# Patient Record
Sex: MALE | Race: NATIVE HAWAIIAN OR OTHER PACIFIC ISLANDER
[De-identification: names, ages, dates, MRNs, and addresses within clinical notes are randomized per-mention and may not be internally consistent; named-entity substitution may affect disease eponyms.]

---

## 2019-01-06 ENCOUNTER — HOSPITAL ENCOUNTER (OUTPATIENT)
Dept: HOSPITAL 74 - JER | Age: 54
Setting detail: OBSERVATION
LOS: 3 days | Discharge: HOME | End: 2019-01-09
Attending: NURSE PRACTITIONER | Admitting: INTERNAL MEDICINE
Payer: COMMERCIAL

## 2019-01-06 VITALS — BODY MASS INDEX: 30.9 KG/M2

## 2019-01-06 DIAGNOSIS — K44.9: ICD-10-CM

## 2019-01-06 DIAGNOSIS — Z87.891: ICD-10-CM

## 2019-01-06 DIAGNOSIS — R94.5: ICD-10-CM

## 2019-01-06 DIAGNOSIS — F10.10: ICD-10-CM

## 2019-01-06 DIAGNOSIS — K29.50: ICD-10-CM

## 2019-01-06 DIAGNOSIS — K74.60: ICD-10-CM

## 2019-01-06 DIAGNOSIS — K31.9: ICD-10-CM

## 2019-01-06 DIAGNOSIS — K25.9: ICD-10-CM

## 2019-01-06 DIAGNOSIS — R10.9: ICD-10-CM

## 2019-01-06 DIAGNOSIS — K64.8: ICD-10-CM

## 2019-01-06 DIAGNOSIS — K83.9: ICD-10-CM

## 2019-01-06 DIAGNOSIS — K62.5: Primary | ICD-10-CM

## 2019-01-06 LAB
ALBUMIN SERPL-MCNC: 2.7 G/DL (ref 3.4–5)
ALP SERPL-CCNC: 100 U/L (ref 45–117)
ALT SERPL-CCNC: 47 U/L (ref 13–61)
ANION GAP SERPL CALC-SCNC: 10 MMOL/L (ref 8–16)
APTT BLD: 36 SECONDS (ref 25.2–36.5)
AST SERPL-CCNC: 134 U/L (ref 15–37)
BASOPHILS # BLD: 0.9 % (ref 0–2)
BILIRUB SERPL-MCNC: 4.3 MG/DL (ref 0.2–1)
BUN SERPL-MCNC: 9 MG/DL (ref 7–18)
CALCIUM SERPL-MCNC: 8.5 MG/DL (ref 8.5–10.1)
CHLORIDE SERPL-SCNC: 108 MMOL/L (ref 98–107)
CO2 SERPL-SCNC: 26 MMOL/L (ref 21–32)
CREAT SERPL-MCNC: 0.7 MG/DL (ref 0.55–1.3)
DEPRECATED RDW RBC AUTO: 16.5 % (ref 11.9–15.9)
EOSINOPHIL # BLD: 0.3 % (ref 0–4.5)
GLUCOSE SERPL-MCNC: 86 MG/DL (ref 74–106)
HCT VFR BLD CALC: 38.5 % (ref 35.4–49)
HGB BLD-MCNC: 13.6 GM/DL (ref 11.7–16.9)
INR BLD: 1.54 (ref 0.83–1.09)
LYMPHOCYTES # BLD: 18.3 % (ref 8–40)
MCH RBC QN AUTO: 39.2 PG (ref 25.7–33.7)
MCHC RBC AUTO-ENTMCNC: 35.3 G/DL (ref 32–35.9)
MCV RBC: 111 FL (ref 80–96)
MONOCYTES # BLD AUTO: 12.3 % (ref 3.8–10.2)
NEUTROPHILS # BLD: 68.2 % (ref 42.8–82.8)
PLATELET # BLD AUTO: 297 K/MM3 (ref 134–434)
PMV BLD: 7.9 FL (ref 7.5–11.1)
POTASSIUM SERPLBLD-SCNC: 3.9 MMOL/L (ref 3.5–5.1)
PROT SERPL-MCNC: 6.4 G/DL (ref 6.4–8.2)
PT PNL PPP: 18.3 SEC (ref 9.7–13)
RBC # BLD AUTO: 3.47 M/MM3 (ref 4–5.6)
SODIUM SERPL-SCNC: 143 MMOL/L (ref 136–145)
WBC # BLD AUTO: 6.7 K/MM3 (ref 4–10)

## 2019-01-06 PROCEDURE — 0DJD8ZZ INSPECTION OF LOWER INTESTINAL TRACT, VIA NATURAL OR ARTIFICIAL OPENING ENDOSCOPIC: ICD-10-PCS | Performed by: INTERNAL MEDICINE

## 2019-01-06 PROCEDURE — 3E0337Z INTRODUCTION OF ELECTROLYTIC AND WATER BALANCE SUBSTANCE INTO PERIPHERAL VEIN, PERCUTANEOUS APPROACH: ICD-10-PCS | Performed by: NURSE PRACTITIONER

## 2019-01-06 PROCEDURE — G0378 HOSPITAL OBSERVATION PER HR: HCPCS

## 2019-01-06 PROCEDURE — 3E013GC INTRODUCTION OF OTHER THERAPEUTIC SUBSTANCE INTO SUBCUTANEOUS TISSUE, PERCUTANEOUS APPROACH: ICD-10-PCS | Performed by: NURSE PRACTITIONER

## 2019-01-06 PROCEDURE — 3E033GC INTRODUCTION OF OTHER THERAPEUTIC SUBSTANCE INTO PERIPHERAL VEIN, PERCUTANEOUS APPROACH: ICD-10-PCS | Performed by: NURSE PRACTITIONER

## 2019-01-06 PROCEDURE — 0DB68ZX EXCISION OF STOMACH, VIA NATURAL OR ARTIFICIAL OPENING ENDOSCOPIC, DIAGNOSTIC: ICD-10-PCS | Performed by: INTERNAL MEDICINE

## 2019-01-06 NOTE — HP
CHIEF COMPLAINT:


abdominal pain and had bright red blood from rectum yesterday 





PCP:none





HISTORY OF PRESENT ILLNESS:


53 year old male with history of alcohol abuse, liver cirrhosis and known 

gallbladder sludge who just completed 27 days of detox in rehabilitation who 

presents with symptoms of abdominal pain and  bright red blood from the rectum 

which started yesterday. He reports mild shortness of breath. He denies chest 

pain, syncopy, dizziness, fever, chills, diarrhea or malaise.  Upon evaluation 

in the ER he was found to have an elevated total bilirubin of 4.3, alt 47 and 

ast 134, normal platelets, hematocrit and hemoglobin . Ammonia level is 

elevated at 86.17 and a low albumin. Blood stool guaiac is positive. He is 

hemodynamically stable and currently has no evidence of active bleeding. He was 

given one dosage of IV protonix in the ER.He is pending a gallbladder 

ultrasound.  





ER course was notable for:


(1)?GI Bleed(stool occult positive) 


(2)ETOH Abuse/ Liver Cirrhosis /Gallbladder sludge





Recent Travel:Denies 





PAST MEDICAL HISTORY:


Alcohol Abuse


Liver Cirrhosis 





PAST SURGICAL HISTORY:


Knee Surgery





Social History:


Smoking: smokes for >20 years, reports about 5 cigarettes /day 


Alcohol:history of alcohol abuse, just completed detox/rehab,reports last drink 

was 27 days ago 


Drugs: denies 





Family History:


Allergies





No Known Allergies Allergy (Verified 01/06/19 20:00)





HOME MEDICATIONS:


REVIEW OF SYSTEMS


CONSTITUTIONAL: 


Absent:  fever, chills, diaphoresis, generalized weakness, malaise, loss of 

appetite, weight change


HEENT: 


Absent:  rhinorrhea, nasal congestion, throat pain, throat swelling, difficulty 

swallowing, mouth swelling, ear pain, eye pain, visual changes


CARDIOVASCULAR: 


Absent: chest pain, syncope, palpitations, irregular heart rate, lightheadedness

, peripheral edema


RESPIRATORY: 


Absent: cough, mild shortness of breath, dyspnea with exertion, orthopnea, 

wheezing, stridor, hemoptysis


GASTROINTESTINAL:


Absent: abdominal pain, abdominal distension, nausea, vomiting, diarrhea, 

constipation, melena, hematochezia


GENITOURINARY: 


Absent: dysuria, frequency, urgency, hesitancy, hematuria, flank pain, genital 

pain, rectal bleed


MUSCULOSKELETAL: 


Absent: myalgia, arthralgia, joint swelling, back pain, neck pain


SKIN: 


Absent: rash, itching, pallor


HEMATOLOGIC/IMMUNOLOGIC: 


Absent: easy bleeding, easy bruising, lymphadenopathy, frequent infections


ENDOCRINE:


Absent: unexplained weight gain, unexplained weight loss, heat intolerance, 

cold intolerance


NEUROLOGIC: 


Absent: headache, focal weakness or paresthesias, dizziness, unsteady gait, 

seizure, mental status changes, bladder or bowel incontinence


PSYCHIATRIC: 


Absent: anxiety, depression, suicidal or homicidal ideation, hallucinations.








PHYSICAL EXAMINATION


 Vital Signs - 24 hr











  01/06/19





  19:57


 


Temperature 98.0 F


 


Pulse Rate 115 H


 


Respiratory 16





Rate 


 


Blood Pressure 138/79


 


O2 Sat by Pulse 100





Oximetry (%) 











GENERAL: awake, alert, and fully oriented, in no acute distress


HEAD: normal with no signs of trauma


EYES: pupils equal, round and reactive to light, sclera anicteric


EARS, NOSE, THROAT: ears normal, nares patent, oropharynx clear without 

exudates. moist mucous membranes.


NECK: Normal range of motion, supple without lymphadenopathy, JVD, or masses.


LUNGS: Breath sounds equal, clear to auscultation bilaterally. no wheezes, and 

no crackles. no accessory muscle use


HEART: Regular rate and rhythm, normal S1 and S2 without murmur, rub or gallop


ABDOMEN: soft, nontender, not distended, normoactive bowel sounds, no guarding, 

no rebound, no masses


MUSCULOSKELETAL: Normal range of motion at all joints. No bony deformities or 

tenderness. No CVA tenderness.


UPPER EXTREMITIES: 2+ pulses, warm, well-perfused. No cyanosis. No clubbing. No 

peripheral edema


LOWER EXTREMITIES: 2+ pulses, warm, well-perfused. No calf tenderness. No 

peripheral edema


NEUROLOGICAL: normal speech


Psch: Appropriate and cooperative  


SKIN: Warm, dry, normal turgor, no rashes or lesions noted, normal capillary 

refill. 


 Laboratory Results - last 24 hr











  01/06/19 01/06/19 01/06/19





  20:50 21:30 21:30


 


WBC   6.7 


 


RBC   3.47 L 


 


Hgb   13.6 


 


Hct   38.5 


 


MCV   111.0 H 


 


MCH   39.2 H 


 


MCHC   35.3 


 


RDW   16.5 H 


 


Plt Count   297 


 


MPV   7.9 


 


Absolute Neuts (auto)   4.6 


 


Neutrophils %   68.2 


 


Lymphocytes %   18.3 


 


Monocytes %   12.3 H 


 


Eosinophils %   0.3 


 


Basophils %   0.9 


 


Nucleated RBC %   0 


 


PT with INR    18.30 H


 


INR    1.54 H


 


PTT (Actin FS)    36.0


 


Sodium   


 


Potassium   


 


Chloride   


 


Carbon Dioxide   


 


Anion Gap   


 


BUN   


 


Creatinine   


 


Creat Clearance w eGFR   


 


Random Glucose   


 


Calcium   


 


Total Bilirubin   


 


AST   


 


ALT   


 


Alkaline Phosphatase   


 


Ammonia   


 


Total Protein   


 


Albumin   


 


Stool Occult Blood  Positive  














  01/06/19 01/06/19





  21:30 21:30


 


WBC  


 


RBC  


 


Hgb  


 


Hct  


 


MCV  


 


MCH  


 


MCHC  


 


RDW  


 


Plt Count  


 


MPV  


 


Absolute Neuts (auto)  


 


Neutrophils %  


 


Lymphocytes %  


 


Monocytes %  


 


Eosinophils %  


 


Basophils %  


 


Nucleated RBC %  


 


PT with INR  


 


INR  


 


PTT (Actin FS)  


 


Sodium  143 


 


Potassium  3.9 


 


Chloride  108 H 


 


Carbon Dioxide  26 


 


Anion Gap  10 


 


BUN  9 


 


Creatinine  0.7 


 


Creat Clearance w eGFR  > 60 


 


Random Glucose  86 


 


Calcium  8.5 


 


Total Bilirubin  4.3 H 


 


AST  134 H 


 


ALT  47 


 


Alkaline Phosphatase  100 


 


Ammonia   86.17 H


 


Total Protein  6.4 


 


Albumin  2.7 L 


 


Stool Occult Blood  











ASSESSMENT/PLAN:


53 year old male with history of alcohol abuse, liver cirrhosis and known 

gallbladder sludge who just completed 27 days of detox in  ehabilitation 

facility who presented with symptoms of abdominal pain and  bright red blood 

from the rectum.  He was found to have an elevated total bilirubin of 4.3, alt 

47 and ast 134, normal platelets, hematocrit and hemoglobin . Ammonia level was 

elevated.  Blood stool guaiac was positive. 





?GI Bleed(stool occult positive) 


Hemodynamically stable, tacycardic in this setting, afebrile, no significant 

anemia, no thrombocytopenia. 


Received IV protonix.


Avoid NSAID's and aspirin. 


NPO after midnight in anticipation of GI procedure tomorrow.


GI consulted - Dr. Mays.





Known Gallbladder Sludge


Pending gallbladder US. 





ETOH Abuse/ Liver Cirrhosis 


Ammonia level elevated.  


No evidence of DT's/withdrawal or encephalopathy.


Low albumin, Added thiamine and  folic acid. 





FEN


NPO after midnight.Monitor electrolytes. 








Visit type





- Emergency Visit


Emergency Visit: Yes


ED Registration Date: 01/06/19


Care time: The patient presented to the Emergency Department on the above date 

and was hospitalized for further evaluation of their emergent condition.





- New Patient


This patient is new to me today: Yes


Date on this admission: 01/06/19





- Critical Care


Critical Care patient: No

## 2019-01-06 NOTE — PDOC
History of Present Illness





- General


Chief Complaint: Rectal Bleed


Stated Complaint: BLOOD IN STOOL


Time Seen by Provider: 01/06/19 20:44





- History of Present Illness


Initial Comments: 





01/06/19 21:03


The patient is a 53 year old male with a history of Alcohol Abuse, Liver 

Disease who presents for evaluation of rectal bleeding.  The patient reports 

noting dark red blood in his still yesterday evening prompting his presentation 

to the ED for further evaluation.  He notes that he recently got out of rehab 

for alcohol abuse and is currently staying at a long-term house.  He otherwise 

denies fevers, chills, SOB, chest pain, nausea, vomiting, abdominal pain, or 

changes with urination.





Past History





- Past Medical History


Allergies/Adverse Reactions: 


 Allergies











Allergy/AdvReac Type Severity Reaction Status Date / Time


 


No Known Allergies Allergy   Verified 01/06/19 20:00











COPD: No


Liver Disease: Yes





- Suicide/Smoking/Psychosocial Hx


Smoking History: Former smoker


Have you smoked in the past 12 months: No


Information on smoking cessation initiated: No


Hx Alcohol Use: No


Drug/Substance Use Hx: No





**Review of Systems





- Review of Systems


Comments:: 





01/06/19 21:04


Constitutional: No fevers, chills, fatigue, malaise


HEENT: No Rhinorrhea, nasal congestion, visual changes


Cardiovascular: No chest pain, syncope, palpitations, lightheadedness


Respiratory: Cough. No SOB, Hemoptysis,


Gastrointestinal: Rectal Bleeding.  No Abdominal pain, Nausea, Vomiting, 

Constipation, Diarrhea, Melena


Genitourinary: No Dysuria, Frequency, Urgency, Hesitancy, Hematuria, Flank pain


Musculoskeletal: No Myalgia, arthralgia


Skin: No rashes, itching, bruising, pallor


Neurologic: No Headache, Dizziness, Numbness, Weakness, or Tingling


Psychiatric: No Hallucinations. No SI or HI








*Physical Exam





- Vital Signs


 Last Vital Signs











Temp Pulse Resp BP Pulse Ox


 


 98.0 F   115 H  16   138/79   100 


 


 01/06/19 19:57  01/06/19 19:57  01/06/19 19:57  01/06/19 19:57  01/06/19 19:57














- Physical Exam


Comments: 





01/06/19 21:05


General Appearance: Nourished. No Apparent Distress


HEENT: Sclera Icteris noted on exam.  No Pharyngeal Erythema, Tonsillar Exudate

, Tonsillar Erythema


Neck: No Cervical Lymphadenopathy


Respiratory/Chest: Lungs Clear, Normal Breath Sounds. No Crackles, Rales, 

Rhonchi, Wheezing


Cardiovascular: Regular Rhythm, Regular Rate. No Murmur, Gallops, Rubs


Gastrointestinal/Abdominal: Normal Bowel Sounds, Soft. No Guarding, Rebound, 

Tenderness


Rectal Exam:  No Gross Blood or Melena.  No Hemorrhoids 


Musculoskeletal: No CVA Tenderness


Extremity: Normal Capillary Refill


Integumentary: Normal Color, Dry, Warm


Neurologic:  Fully Oriented, Alert, Normal Mood/Affect, Normal Response, 





Moderate Sedation





- Procedure Monitoring


Vital Signs: 


Procedure Monitoring Vital Signs











Temperature  98.0 F   01/06/19 19:57


 


Pulse Rate  115 H  01/06/19 19:57


 


Respiratory Rate  16   01/06/19 19:57


 


Blood Pressure  138/79   01/06/19 19:57


 


O2 Sat by Pulse Oximetry (%)  100   01/06/19 19:57











ED Treatment Course





- LABORATORY


CBC & Chemistry Diagram: 


 01/06/19 21:30





 01/06/19 21:30





Medical Decision Making





- Medical Decision Making





01/06/19 21:06


The patient is a 53 year old male with a history of Alcohol Abuse, Liver 

Disease who presents for evaluation of rectal bleeding.  Given the patient's 

history and physical exam, we will obtain a cbc, cmp, ammonia, stool for occult 

blood, coags to evaluate further.  We will continue to monitor and reassess 

while here in the ED.





01/06/19 22:39


CBC is unremarkable. CMP demonstrates a t.bili of 4.3.  Stool for occult blood 

was positive.  We have treated the patient with protonix here in the ED.  Given 

his rectal bleeding, sclera icteris, elevated t.bili and poor outpatient follow 

up we believe he require observation admission for GI consultation.  We will 

obtain a gallbladder US to evaluate further as well.  We discussed the case 

with the hospitalist team who accepted the patient for admission.








*DC/Admit/Observation/Transfer


Diagnosis at time of Disposition: 


 Rectal bleeding





Abdominal pain


Qualifiers:


 Abdominal location: unspecified location Qualified Code(s): R10.9 - 

Unspecified abdominal pain








- Discharge Dispostion


Condition at time of disposition: Stable


Decision to Admit order: Yes





- Referrals





- Patient Instructions





- Post Discharge Activity

## 2019-01-06 NOTE — PDOC
Attending Attestation





- Resident


Resident Name: Jer Guo





- ED Attending Attestation


I have performed the following: I have examined & evaluated the patient, The 

case was reviewed & discussed with the resident, I agree w/resident's findings 

& plan, Exceptions are as noted





- HPI


HPI: 





01/06/19 21:48


53-year-old male presents because he found red blood in his stool


Patient denies any fever or chills or diarrhea.


Past medical history long-standing alcohol abuse, liver cirrhosis, recently 

discharged from [facility] and currently in a [facility] house here 

in [place].  He typically resides in the [place], [place].


He has no primary care physician and usually does not seek any medical care





- Physicial Exam


PE: 





01/06/19 21:50


54 yo male looking p/w history of red blood in his stool


head   ncat


eyes   icteric sclera,nissa,eomi


lungs   no wheezing,no crackles,cta b/l


cvs   [id number]


abd  no guarding,no rebound


no cva tenderness


ext + pedal edema


rectal brown stool, no melena


skin warm and dry


neuro  axox3, ambulatory








01/06/19 22:22








- Medical Decision Making





01/06/19 22:23


stool culture for blood is POSITIVE


no anemia


LFT and tbili elevated 


ammonia level is high >85


01/06/19 23:47


pt admitted to  med/surg

## 2019-01-07 LAB
ALBUMIN SERPL-MCNC: 2.3 G/DL (ref 3.4–5)
ALP SERPL-CCNC: 81 U/L (ref 45–117)
ALT SERPL-CCNC: 38 U/L (ref 13–61)
ANION GAP SERPL CALC-SCNC: 5 MMOL/L (ref 8–16)
ANISOCYTOSIS BLD QL: (no result)
AST SERPL-CCNC: 103 U/L (ref 15–37)
BILIRUB SERPL-MCNC: 3.6 MG/DL (ref 0.2–1)
BUN SERPL-MCNC: 8 MG/DL (ref 7–18)
CALCIUM SERPL-MCNC: 8 MG/DL (ref 8.5–10.1)
CHLORIDE SERPL-SCNC: 109 MMOL/L (ref 98–107)
CO2 SERPL-SCNC: 28 MMOL/L (ref 21–32)
CREAT SERPL-MCNC: 0.5 MG/DL (ref 0.55–1.3)
DEPRECATED RDW RBC AUTO: 16.6 % (ref 11.9–15.9)
GLUCOSE SERPL-MCNC: 76 MG/DL (ref 74–106)
HCT VFR BLD CALC: 35.6 % (ref 35.4–49)
HGB BLD-MCNC: 12.6 GM/DL (ref 11.7–16.9)
MACROCYTES BLD QL: (no result)
MCH RBC QN AUTO: 38.8 PG (ref 25.7–33.7)
MCHC RBC AUTO-ENTMCNC: 35.5 G/DL (ref 32–35.9)
MCV RBC: 109.5 FL (ref 80–96)
PLATELET # BLD AUTO: 257 K/MM3 (ref 134–434)
PLATELET BLD QL SMEAR: NORMAL
PMV BLD: 8.1 FL (ref 7.5–11.1)
POTASSIUM SERPLBLD-SCNC: 4.1 MMOL/L (ref 3.5–5.1)
PROT SERPL-MCNC: 5.6 G/DL (ref 6.4–8.2)
RBC # BLD AUTO: 3.25 M/MM3 (ref 4–5.6)
SODIUM SERPL-SCNC: 142 MMOL/L (ref 136–145)
WBC # BLD AUTO: 5.6 K/MM3 (ref 4–10)

## 2019-01-07 RX ADMIN — FOLIC ACID SCH MG: 1 TABLET ORAL at 10:34

## 2019-01-07 RX ADMIN — FLUTICASONE PROPIONATE SCH: 50 SPRAY, METERED NASAL at 22:57

## 2019-01-07 RX ADMIN — Medication SCH MG: at 10:34

## 2019-01-07 RX ADMIN — ACETAMINOPHEN PRN MG: 325 TABLET ORAL at 15:51

## 2019-01-07 RX ADMIN — ACETAMINOPHEN PRN MG: 325 TABLET ORAL at 21:41

## 2019-01-07 NOTE — EKG
Test Reason : 

Blood Pressure : ***/*** mmHG

Vent. Rate : 097 BPM     Atrial Rate : 097 BPM

   P-R Int : 132 ms          QRS Dur : 076 ms

    QT Int : 362 ms       P-R-T Axes : -11 -26 008 degrees

   QTc Int : 459 ms

 

NORMAL SINUS RHYTHM

SEPTAL INFARCT , AGE UNDETERMINED

ABNORMAL ECG

NO PREVIOUS ECGS AVAILABLE

Confirmed by CHRIS SANTOS, NATASHA ([id number]) on 1/7/2019 9:38:47 AM

 

Referred By:             Confirmed By:NATASHA PEREZ MD

## 2019-01-07 NOTE — CON.GI
Consult


Consult Specialty:: Medicine


Reason for Consultation:: rectal bleed, cirrhosis





- History of Present Illness


Chief Complaint: BRBPR


History of Present Illness: 





53M with h/o ETOH and seems cirrhosis based on imaging here presenting for 

evaluation of multiple episodes of hematochezia saturday and sunday. He denies 

stool with these episodes, states >20 times. No dizziness or lightheadedness, 

no N/V. +ETOH hx, was sober for 6 years but relapsed recently after family 

death and completed 1 month rehab. Reports no prior endoscopy or colonoscopy. 

Is having clears today, but reports that last episode of bleeding was just 

before interview.





- History Source


History Provided By: Patient


Limitations to Obtaining History: No Limitations





- Alcohol/Substance Use


Hx Alcohol Use: Yes (2 quarts of vodka daily)





- Smoking History


Smoking history: Current every day smoker


Have you smoked in the past 12 months: Yes


Aproximately how many cigarettes per day: 5





Home Medications





- Allergies


Allergies/Adverse Reactions: 


 Allergies











Allergy/AdvReac Type Severity Reaction Status Date / Time


 


No Known Allergies Allergy   Verified 01/06/19 20:00














Review of Systems





- Review of Systems


Constitutional: reports: No Symptoms


Eyes: reports: No Symptoms


HENT: reports: No Symptoms


Neck: reports: No Symptoms


Cardiovascular: reports: No Symptoms


Respiratory: reports: No Symptoms


Gastrointestinal: reports: Rectal Bleeding


Musculoskeletal: reports: No Symptoms


Neurological: reports: No Symptoms


Hematology/Lymphatic: reports: No Symptoms


Psychiatric: reports: No Symptoms





Physical Exam-GI


Vital Signs: 


 Vital Signs











Temperature  98.1 F   01/07/19 06:00


 


Pulse Rate  85   01/07/19 11:51


 


Respiratory Rate  18   01/07/19 11:51


 


Blood Pressure  149/86   01/07/19 11:51


 


O2 Sat by Pulse Oximetry (%)  96   01/07/19 04:00











Constitutional: Yes: Well Nourished, No Distress


Eyes: Yes: WNL, Sclera Icterus


Cardiovascular: Yes: Regular Rate and Rhythm


Respiratory: Yes: CTA Bilaterally


...Auscultate: Yes: Normoactive Bowel Sounds


...Palpate: Yes: Soft.  No: Tenderness


...Percussion: No: Fluid Wave


...Rectal Exam: Yes: Other (Perianal area irritated; CAITLIN without blood, stool, 

mass)


Musculoskeletal: Yes: WNL


Extremities: Yes: WNL


Edema: No


Neurological: Yes: WNL, Alert, Oriented


Labs: 


 CBC, BMP





 01/07/19 06:30 





 01/07/19 06:30 





 INR, PTT











INR  1.54  (0.83-1.09)  H  01/06/19  21:30    














Imaging





- Results


Ultrasound: Report Reviewed





Assessment/Plan


Painless hematochezia - unlikely upper GI bleeding given stability of hgb and 

patient hemodynamics. Suspect lower - hemorrhoidal vs diverticular vs ectasia. 


- clears today


- 4L golytely and 20mg PO dulcolax tonight


- Please repeat INR in am


- plan for colonoscopy +/- EGD tomorrow; NPO after MN





Unclear if patient is cirrhotic from labs and imaging - may just be low grade 

ETOH hepatitis. Gallbladder wall thickening may be related to ascites 

particularly in absence of pain. Discriminant function 28.


- encouraged ETOH abstinence

## 2019-01-07 NOTE — PN
Physical Exam: 


SUBJECTIVE: Patient seen and examined


  Feels better. 


  But c/o thirst.


  Otherwise No Acute distress





OBJECTIVE:





 Vital Signs











 Period  Temp  Pulse  Resp  BP Sys/Agudelo  Pulse Ox


 


 Last 24 Hr  98.0 F-98.7 F    16-20  134-149/75-94  











GENERAL: The patient is awake, alert, and fully oriented, in no acute distress.


HEAD: Normal with no signs of trauma.


EYES: PERRL, extraocular movements intact, sclera anicteric, conjunctiva clear. 

No ptosis. 


ENT: Ears normal, nares patent, oropharynx clear without exudates, moist mucous 


membranes.


NECK: Trachea midline, full range of motion, supple. 


LUNGS: Breath sounds equal, clear to auscultation bilaterally, no wheezes, no 

crackles, no 


accessory muscle use. 


HEART: Regular rate and rhythm, S1, S2 without murmur, rub or gallop.


ABDOMEN: Soft, nontender, nondistended, normoactive bowel sounds, no guarding, 

no 


rebound, no hepatosplenomegaly, no masses.


EXTREMITIES: 2+ pulses, warm, well-perfused, no edema. 


NEUROLOGICAL: Cranial nerves II through XII grossly intact. Normal speech, gait 

not 


observed.


PSYCH: Normal mood, normal affect.


SKIN: Warm, dry, normal turgor, no rashes or lesions noted














 Laboratory Results - last 24 hr











  01/06/19 01/06/19 01/06/19





  20:50 21:30 21:30


 


WBC   6.7 


 


RBC   3.47 L 


 


Hgb   13.6 


 


Hct   38.5 


 


MCV   111.0 H 


 


MCH   39.2 H 


 


MCHC   35.3 


 


RDW   16.5 H 


 


Plt Count   297 


 


MPV   7.9 


 


Absolute Neuts (auto)   4.6 


 


Neutrophils %   68.2 


 


Lymphocytes %   18.3 


 


Monocytes %   12.3 H 


 


Eosinophils %   0.3 


 


Basophils %   0.9 


 


Nucleated RBC %   0 


 


Hypochromia   0 


 


Platelet Estimate   Normal 


 


Polychromasia   0 


 


Poikilocytosis   0 


 


Anisocytosis   1+ 


 


Microcytosis   0 


 


Macrocytosis   2+ 


 


PT with INR    18.30 H


 


INR    1.54 H


 


PTT (Actin FS)    36.0


 


Sodium   


 


Potassium   


 


Chloride   


 


Carbon Dioxide   


 


Anion Gap   


 


BUN   


 


Creatinine   


 


Creat Clearance w eGFR   


 


Random Glucose   


 


Calcium   


 


Total Bilirubin   


 


AST   


 


ALT   


 


Alkaline Phosphatase   


 


Ammonia   


 


Total Protein   


 


Albumin   


 


Stool Occult Blood  Positive  














  01/06/19 01/06/19 01/07/19





  21:30 21:30 06:30


 


WBC    5.6


 


RBC    3.25 L


 


Hgb    12.6


 


Hct    35.6


 


MCV    109.5 H


 


MCH    38.8 H


 


MCHC    35.5


 


RDW    16.6 H


 


Plt Count    257


 


MPV    8.1


 


Absolute Neuts (auto)   


 


Neutrophils %   


 


Lymphocytes %   


 


Monocytes %   


 


Eosinophils %   


 


Basophils %   


 


Nucleated RBC %   


 


Hypochromia   


 


Platelet Estimate   


 


Polychromasia   


 


Poikilocytosis   


 


Anisocytosis   


 


Microcytosis   


 


Macrocytosis   


 


PT with INR   


 


INR   


 


PTT (Actin FS)   


 


Sodium  143  


 


Potassium  3.9  


 


Chloride  108 H  


 


Carbon Dioxide  26  


 


Anion Gap  10  


 


BUN  9  


 


Creatinine  0.7  


 


Creat Clearance w eGFR  > 60  


 


Random Glucose  86  


 


Calcium  8.5  


 


Total Bilirubin  4.3 H  


 


AST  134 H  


 


ALT  47  


 


Alkaline Phosphatase  100  


 


Ammonia   86.17 H 


 


Total Protein  6.4  


 


Albumin  2.7 L  


 


Stool Occult Blood   














  01/07/19





  06:30


 


WBC 


 


RBC 


 


Hgb 


 


Hct 


 


MCV 


 


MCH 


 


MCHC 


 


RDW 


 


Plt Count 


 


MPV 


 


Absolute Neuts (auto) 


 


Neutrophils % 


 


Lymphocytes % 


 


Monocytes % 


 


Eosinophils % 


 


Basophils % 


 


Nucleated RBC % 


 


Hypochromia 


 


Platelet Estimate 


 


Polychromasia 


 


Poikilocytosis 


 


Anisocytosis 


 


Microcytosis 


 


Macrocytosis 


 


PT with INR 


 


INR 


 


PTT (Actin FS) 


 


Sodium  142


 


Potassium  4.1


 


Chloride  109 H


 


Carbon Dioxide  28


 


Anion Gap  5 L


 


BUN  8


 


Creatinine  0.5 L


 


Creat Clearance w eGFR  > 60


 


Random Glucose  76


 


Calcium  8.0 L


 


Total Bilirubin  3.6 H


 


AST  103 H


 


ALT  38


 


Alkaline Phosphatase  81


 


Ammonia 


 


Total Protein  5.6 L


 


Albumin  2.3 L


 


Stool Occult Blood 








Active Medications











Generic Name Dose Route Start Last Admin





  Trade Name Freq  PRN Reason Stop Dose Admin


 


Bisacodyl  20 mg  01/07/19 14:30  





  Dulcolax -  PO  01/07/19 14:31  





  ONCE ONE   





     





     





     





     


 


Folic Acid  1 mg  01/07/19 10:00 01/07/19 10:34





  Folic Acid -  PO   1 mg





  DAILY MAC   Administration





     





     





     





     


 


Thiamine HCl  100 mg  01/07/19 10:00  01/07/19 10:34





  Vitamin B1 -  PO   100 mg





  DAILY MAC   Administration





     





     





     





     











ASSESSMENT/PLAN:


53 year old male with history of alcohol abuse, liver cirrhosis and known 

gallbladder sludge who just completed 27 days of detox in rehabilitation who 

presents with symptoms of abdominal pain and  bright red blood from the rectum 

which started yesterday. He reports mild shortness of breath. He denies chest 

pain, syncopy, dizziness, fever, chills, diarrhea or malaise.  Upon evaluation 

in the ER he was found to have an elevated total bilirubin of 4.3, alt 47 and 

ast 134, normal platelets, hematocrit and hemoglobin . Ammonia level is 

elevated at 86.17 and a low albumin. Blood stool guaiac is positive. He is 

hemodynamically stable and currently has no evidence of active bleeding. He was 

given one dosage of IV protonix in the ER.He is pending a gallbladder 

ultrasound.





# Acute LGI bleed.


   H/H stable. 


   Trend CBC closely.


   F/U with GI eval.


   Would start clear liquid for now.


   


# Chronic etoh abuse with Liver cirrhosis.


   c/w thiamine /folate.





  Plan d/w the patient at bedside.








Visit type





- Emergency Visit


Emergency Visit: Yes


ED Registration Date: 01/06/19


Care time: The patient presented to the Emergency Department on the above date 

and was hospitalized for further evaluation of their emergent condition.





- New Patient


This patient is new to me today: Yes


Date on this admission: 01/07/19





- Critical Care


Critical Care patient: No





- Discharge Referral


Referred to [facility] No

## 2019-01-08 LAB
ALBUMIN SERPL-MCNC: 2.6 G/DL (ref 3.4–5)
ALP SERPL-CCNC: 89 U/L (ref 45–117)
ALT SERPL-CCNC: 41 U/L (ref 13–61)
ANION GAP SERPL CALC-SCNC: 9 MMOL/L (ref 8–16)
AST SERPL-CCNC: 99 U/L (ref 15–37)
BASOPHILS # BLD: 0.9 % (ref 0–2)
BILIRUB SERPL-MCNC: 4.1 MG/DL (ref 0.2–1)
BUN SERPL-MCNC: 6 MG/DL (ref 7–18)
CALCIUM SERPL-MCNC: 8.2 MG/DL (ref 8.5–10.1)
CHLORIDE SERPL-SCNC: 106 MMOL/L (ref 98–107)
CO2 SERPL-SCNC: 28 MMOL/L (ref 21–32)
CREAT SERPL-MCNC: 0.5 MG/DL (ref 0.55–1.3)
DEPRECATED RDW RBC AUTO: 16.8 % (ref 11.9–15.9)
EOSINOPHIL # BLD: 1.7 % (ref 0–4.5)
GLUCOSE SERPL-MCNC: 74 MG/DL (ref 74–106)
HCT VFR BLD CALC: 41.7 % (ref 35.4–49)
HGB BLD-MCNC: 13.4 GM/DL (ref 11.7–16.9)
INR BLD: 1.39 (ref 0.83–1.09)
LYMPHOCYTES # BLD: 22.2 % (ref 8–40)
MCH RBC QN AUTO: 35.6 PG (ref 25.7–33.7)
MCHC RBC AUTO-ENTMCNC: 32.3 G/DL (ref 32–35.9)
MCV RBC: 110.3 FL (ref 80–96)
MONOCYTES # BLD AUTO: 12.9 % (ref 3.8–10.2)
NEUTROPHILS # BLD: 62.3 % (ref 42.8–82.8)
PLATELET # BLD AUTO: 254 K/MM3 (ref 134–434)
PMV BLD: 7.4 FL (ref 7.5–11.1)
POTASSIUM SERPLBLD-SCNC: 3.2 MMOL/L (ref 3.5–5.1)
PROT SERPL-MCNC: 6.1 G/DL (ref 6.4–8.2)
PT PNL PPP: 16.4 SEC (ref 9.7–13)
RBC # BLD AUTO: 3.78 M/MM3 (ref 4–5.6)
SODIUM SERPL-SCNC: 142 MMOL/L (ref 136–145)
WBC # BLD AUTO: 6.3 K/MM3 (ref 4–10)

## 2019-01-08 RX ADMIN — POTASSIUM CHLORIDE SCH MLS/HR: 7.46 INJECTION, SOLUTION INTRAVENOUS at 12:22

## 2019-01-08 RX ADMIN — FLUTICASONE PROPIONATE SCH SPRAY: 50 SPRAY, METERED NASAL at 22:11

## 2019-01-08 RX ADMIN — POTASSIUM CHLORIDE SCH MLS/HR: 7.46 INJECTION, SOLUTION INTRAVENOUS at 10:11

## 2019-01-08 RX ADMIN — FLUTICASONE PROPIONATE SCH: 50 SPRAY, METERED NASAL at 12:21

## 2019-01-08 RX ADMIN — ACETAMINOPHEN PRN MG: 325 TABLET ORAL at 17:46

## 2019-01-08 RX ADMIN — PANTOPRAZOLE SODIUM SCH MG: 40 TABLET, DELAYED RELEASE ORAL at 13:13

## 2019-01-08 RX ADMIN — Medication SCH MG: at 12:22

## 2019-01-08 RX ADMIN — PHENYLEPHRINE HYDROCHLORIDE AND FAT, HARD SCH EACH: .00525; 1.86 SUPPOSITORY RECTAL at 22:11

## 2019-01-08 RX ADMIN — FOLIC ACID SCH MG: 1 TABLET ORAL at 12:22

## 2019-01-08 RX ADMIN — ACETAMINOPHEN PRN MG: 325 TABLET ORAL at 06:47

## 2019-01-08 NOTE — PN
Physical Exam: 


SUBJECTIVE: Patient seen and examined at the bedside.





had colonoscopy today, feels well, denies pain.





OBJECTIVE:


post colonoscopy today


replete K


 Vital Signs











 Period  Temp  Pulse  Resp  BP Sys/Agudelo  Pulse Ox


 


 Last 24 Hr  98.0 F-99.1 F    18-22  114-153/71-98  91-99











GENERAL: The patient is awake, alert, and fully oriented, in no acute distress.


HEAD: Normal with no signs of trauma.


EYES: PERRL, extraocular movements intact, sclera anicteric, conjunctiva clear. 

No ptosis. 


ENT: Ears normal, nares patent, oropharynx clear without exudates, moist mucous 

membranes.


NECK: Trachea midline, full range of motion, supple. 


LUNGS: Breath sounds equal, clear to auscultation bilaterally, no wheezes, no 

crackles, no accessory muscle use. 


HEART: Regular rate and rhythm, S1, S2 without murmur, rub or gallop.


ABDOMEN: Soft, nontender, nondistended, normoactive bowel sounds, no guarding, 

no 


rebound, no hepatosplenomegaly, no masses.


EXTREMITIES: 2+ pulses, warm, well-perfused, no edema. 


NEUROLOGICAL: Cranial nerves II through XII grossly intact. Normal speech, gait 

not observed.


PSYCH: Normal mood, normal affect.


SKIN: Warm, dry, normal turgor, no rashes or lesions noted








 Laboratory Results - last 24 hr











  01/08/19 01/08/19 01/08/19





  07:30 07:30 08:45


 


WBC   6.3 


 


RBC   3.78 L 


 


Hgb   13.4 


 


Hct   41.7  D 


 


MCV   110.3 H 


 


MCH   35.6 H 


 


MCHC   32.3 


 


RDW   16.8 H 


 


Plt Count   254 


 


MPV   7.4 L 


 


Absolute Neuts (auto)   3.9 


 


Neutrophils %   62.3 


 


Lymphocytes %   22.2  D 


 


Monocytes %   12.9 H 


 


Eosinophils %   1.7  D 


 


Basophils %   0.9 


 


Nucleated RBC %   0 


 


PT with INR  16.40 H  


 


INR  1.39 H  


 


Sodium    142


 


Potassium    3.2 L


 


Chloride    106


 


Carbon Dioxide    28


 


Anion Gap    9


 


BUN    6 L


 


Creatinine    0.5 L


 


Creat Clearance w eGFR    > 60


 


Random Glucose    74


 


Calcium    8.2 L


 


Total Bilirubin    4.1 H


 


AST    99 H


 


ALT    41


 


Alkaline Phosphatase    89


 


Total Protein    6.1 L


 


Albumin    2.6 L








Active Medications











Generic Name Dose Route Start Last Admin





  Trade Name Freq  PRN Reason Stop Dose Admin


 


Acetaminophen  650 mg  01/07/19 15:41  01/08/19 06:47





  Tylenol -  PO   650 mg





  Q6H PRN   Administration





  HEADACHE   





     





     





     


 


Fluticasone Propionate  1 spray  01/07/19 22:00  01/08/19 12:21





  Flonase -  NS   Not Given





  BID MAC   





     





     





     





     


 


Folic Acid  1 mg  01/07/19 10:00  01/08/19 12:22





  Folic Acid -  PO   1 mg





  DAILY MAC   Administration





     





     





     





     


 


Sodium Chloride  1,000 mls @ 50 mls/hr  01/08/19 08:45  01/08/19 09:11





  Normal Saline -  IV  01/09/19 08:36  50 mls/hr





  ASDIR MAC   Administration





     





     





     





     


 


Pantoprazole Sodium  40 mg  01/08/19 12:30  01/08/19 13:13





  Protonix -  PO   40 mg





  DAILY MAC   Administration





     





     





     





     


 


Starch  1 each  01/08/19 22:00  





  Anusol Suppository -  RC  01/13/19 21:59  





  BID MAC   





     





     





     





     


 


Thiamine HCl  100 mg  01/07/19 10:00  01/08/19 12:22





  Vitamin B1 -  PO   100 mg





  DAILY MAC   Administration





     





     





     





     











ASSESSMENT/PLAN:





Patient is a 53 year old male with history of alcohol abuse, liver cirrhosis 

and gallbladder sludge.  Patient is s/p 27 days of detox at a rehab and 

presents to the ED on 1/6/2019 with abdominal pain and bright red blood from 

rectum that started the day before admission.   Upon evaluation in the ER he 

was found to have an elevated total bilirubin of 4.3, alt 47 and ast 134, 

normal platelets, normal hct/hmg, elevated ammonia level at 86. 





GI:


Acute lowr GI bleed


hmg/hmt stable.  continue to trend with daily cbc.


s/p EGD and colonoscopy today


EGD 1/8/2019: avoid nsaids, continue protonix 40mg daily x 8 weeks. repeat EGD 

in 2 months to assess for ulcer healing. 


Colonoscopy 1/8/2019: colon mucosa normal, gi bleeding may be due to internal 

hemorrhoids.


On full liquid diet





Psyche:


ETOH abuse


Liver cirrhosis


patient is s/p rehab


continue with thiamine, folate





fen


full liquid diet, advance per gi


monitor electrolytes


repleted K, recheck levels





prophy


SCDs








Visit type





- Emergency Visit


Emergency Visit: Yes


ED Registration Date: 01/06/19


Care time: The patient presented to the Emergency Department on the above date 

and was hospitalized for further evaluation of their emergent condition.





- New Patient


This patient is new to me today: Yes


Date on this admission: 01/08/19





- Critical Care


Critical Care patient: No





- Discharge Referral


Referred to [facility] No

## 2019-01-08 NOTE — PN
Progress Note (short form)





- Note


Progress Note: 





EGD/COlon complete. Reports placed in procedural section of physical chart and 

to be scanned into [facility]

## 2019-01-09 VITALS — TEMPERATURE: 97.9 F | DIASTOLIC BLOOD PRESSURE: 82 MMHG | SYSTOLIC BLOOD PRESSURE: 132 MMHG | HEART RATE: 83 BPM

## 2019-01-09 LAB
ALBUMIN SERPL-MCNC: 2.5 G/DL (ref 3.4–5)
ALP SERPL-CCNC: 89 U/L (ref 45–117)
ALT SERPL-CCNC: 36 U/L (ref 13–61)
ANION GAP SERPL CALC-SCNC: 6 MMOL/L (ref 8–16)
AST SERPL-CCNC: 80 U/L (ref 15–37)
BASOPHILS # BLD: 0.6 % (ref 0–2)
BILIRUB SERPL-MCNC: 4.2 MG/DL (ref 0.2–1)
BUN SERPL-MCNC: 4 MG/DL (ref 7–18)
CALCIUM SERPL-MCNC: 8.3 MG/DL (ref 8.5–10.1)
CHLORIDE SERPL-SCNC: 108 MMOL/L (ref 98–107)
CO2 SERPL-SCNC: 26 MMOL/L (ref 21–32)
CREAT SERPL-MCNC: 0.6 MG/DL (ref 0.55–1.3)
DEPRECATED RDW RBC AUTO: 17.3 % (ref 11.9–15.9)
EOSINOPHIL # BLD: 1.7 % (ref 0–4.5)
GLUCOSE SERPL-MCNC: 87 MG/DL (ref 74–106)
HCT VFR BLD CALC: 41.7 % (ref 35.4–49)
HGB BLD-MCNC: 13.5 GM/DL (ref 11.7–16.9)
LYMPHOCYTES # BLD: 18 % (ref 8–40)
MAGNESIUM SERPL-MCNC: 1.9 MG/DL (ref 1.8–2.4)
MCH RBC QN AUTO: 35.4 PG (ref 25.7–33.7)
MCHC RBC AUTO-ENTMCNC: 32.4 G/DL (ref 32–35.9)
MCV RBC: 109.1 FL (ref 80–96)
MONOCYTES # BLD AUTO: 10.2 % (ref 3.8–10.2)
NEUTROPHILS # BLD: 69.5 % (ref 42.8–82.8)
PLATELET # BLD AUTO: 238 K/MM3 (ref 134–434)
PMV BLD: 7.4 FL (ref 7.5–11.1)
POTASSIUM SERPLBLD-SCNC: 3.3 MMOL/L (ref 3.5–5.1)
PROT SERPL-MCNC: 6.2 G/DL (ref 6.4–8.2)
RBC # BLD AUTO: 3.82 M/MM3 (ref 4–5.6)
SODIUM SERPL-SCNC: 140 MMOL/L (ref 136–145)
WBC # BLD AUTO: 9 K/MM3 (ref 4–10)

## 2019-01-09 RX ADMIN — FOLIC ACID SCH MG: 1 TABLET ORAL at 09:38

## 2019-01-09 RX ADMIN — PANTOPRAZOLE SODIUM SCH MG: 40 TABLET, DELAYED RELEASE ORAL at 09:37

## 2019-01-09 RX ADMIN — ACETAMINOPHEN PRN MG: 325 TABLET ORAL at 05:02

## 2019-01-09 RX ADMIN — PHENYLEPHRINE HYDROCHLORIDE AND FAT, HARD SCH EACH: .00525; 1.86 SUPPOSITORY RECTAL at 09:38

## 2019-01-09 RX ADMIN — FLUTICASONE PROPIONATE SCH SPRAY: 50 SPRAY, METERED NASAL at 09:38

## 2019-01-09 RX ADMIN — Medication SCH MG: at 09:38

## 2019-01-09 NOTE — PN
Progress Note, Physician


Chief Complaint: 





rectal bleeding


History of Present Illness: 





Pt seen/examined at bedside, feeling better, reports soft brown bm this am, no 

blood. Denies abdominal pain, n/v.





- Current Medication List


Current Medications: 


Active Medications





Acetaminophen (Tylenol -)  650 mg PO Q6H PRN


   PRN Reason: HEADACHE


   Last Admin: 01/09/19 05:02 Dose:  650 mg


Fluticasone Propionate (Flonase -)  1 spray NS BID [facility]


   Last Admin: 01/09/19 09:38 Dose:  1 spray


Folic Acid (Folic Acid -)  1 mg PO DAILY [facility]


   Last Admin: 01/09/19 09:38 Dose:  1 mg


Pantoprazole Sodium (Protonix -)  40 mg PO DAILY [facility]


   Last Admin: 01/09/19 09:37 Dose:  40 mg


Starch (Anusol Suppository -)  1 each RC BID [facility]


   Stop: 01/13/19 21:59


   Last Admin: 01/09/19 09:38 Dose:  1 each


Thiamine HCl (Vitamin B1 -)  100 mg PO DAILY [facility]


   Last Admin: 01/09/19 09:38 Dose:  100 mg











- Objective


Vital Signs: 


 Vital Signs











Temperature  97.9 F   01/09/19 11:00


 


Pulse Rate  83   01/09/19 11:00


 


Respiratory Rate  18   01/09/19 11:00


 


Blood Pressure  132/82   01/09/19 11:00


 


O2 Sat by Pulse Oximetry (%)  99   01/09/19 10:00











Constitutional: Yes: Well Nourished, No Distress, Calm


Cardiovascular: Yes: WNL, Regular Rate and Rhythm


Respiratory: Yes: WNL, Regular, CTA Bilaterally


Gastrointestinal: Yes: WNL, Normal Bowel Sounds, Soft, Other (Abd soft, nt, nd)


Labs: 


 CBC, BMP





 01/09/19 06:00 





 01/09/19 06:00 





 INR, PTT











INR  1.39  (0.83-1.09)  H  01/08/19  07:30    














Problem List





- Problems


(1) Rectal bleeding


Assessment/Plan: 


s/p EGD and colonoscopy revealing gastric fundus ulcer, large hiatal hernia, 

gastropathy, biopsies taken r/o H pylori. Colonoscopy revealing fair prep in 

right colon, internal hemorrhoids, likely source of rectal bleeding. Hb remains 

stable.








-Continue to closely monitor Hb and for evidence of bleeding


-PPI daily


-Follow up pathology results


-Avoid straining/constipation


-High fiber diet


-Miralax daily


-Pt will require repeat EGD in 8 weeks and repeat colonoscopy in view of prep 

on this exam (could be performed at same time). Pt informed and agreeable.


 


Code(s): K62.5 - HEMORRHAGE OF ANUS AND RECTUM   





(2) Elevated LFTs


Assessment/Plan: 


Likely secondary to alcoholic hepatitis, less likely underlying ?early 

cirrhosis though cannot exclude. LFTs now downtrending. No varices seen on EGD. 


-Recommend monitor LFT trend


-Avoid nonessential hepatotoxic medications


-Strict etoh abstinence


-Check hepatitis serologies


Code(s): R94.5 - ABNORMAL RESULTS OF LIVER FUNCTION STUDIES

## 2019-01-09 NOTE — DS
Physical Exam: 


SUBJECTIVE: 





Patient seen and examined at the bedside.  Feels well, ambulating in room.  

Denies pain.  No further bleeding. 


Wants to shower.  Patient is willing to continue rehab at the [facility] were 

is will continue to abstain from alcohol.


He reports soft brown bm this am, no blood. Denies abdominal pain, n/v.





OBJECTIVE:





Patient is to be discharged home.  He will follow up with Dr. Sagastume as an 

outpatient for the biopsy results of the colonoscopy, egd.


Medications called into his pharmacy


Patient to return to outpatient facility for alcohol abstinence


 Vital Signs











 Period  Temp  Pulse  Resp  BP Sys/Agudelo  Pulse Ox


 


 Last 24 Hr  97.9 F-99.4 F  81-89  17-22  132-150/75-94  91-99








PHYSICAL EXAM





GENERAL: The patient is awake, alert, and fully oriented, in no acute distress.


HEAD: Normal with no signs of trauma.


EYES: PERRL, extraocular movements intact, sclera anicteric, conjunctiva clear. 

No ptosis. 


ENT: Ears normal, nares patent, oropharynx clear without exudates, moist mucous 

membranes.


NECK: Trachea midline, full range of motion, supple. 


LUNGS: Breath sounds equal, clear to auscultation bilaterally, no wheezes


HEART: Regular rate and rhythm


ABDOMEN: Soft, nontender, nondistended, normoactive bowel sounds, no guarding, 

no rebound


EXTREMITIES: 2+ pulses, warm, well-perfused, no edema. 


NEUROLOGICAL: Normal speech, gait not observed.


PSYCH: Normal mood, normal affect.


SKIN: Warm, dry, normal turgor, no rashes or lesions noted





LABS


 Laboratory Results - last 24 hr











  01/09/19 01/09/19





  06:00 06:00


 


WBC  9.0 


 


RBC  3.82 L 


 


Hgb  13.5 


 


Hct  41.7 


 


MCV  109.1 H 


 


MCH  35.4 H 


 


MCHC  32.4 


 


RDW  17.3 H 


 


Plt Count  238 


 


MPV  7.4 L 


 


Absolute Neuts (auto)  6.2 


 


Neutrophils %  69.5 


 


Lymphocytes %  18.0 


 


Monocytes %  10.2 


 


Eosinophils %  1.7 


 


Basophils %  0.6 


 


Nucleated RBC %  0 


 


Sodium   140


 


Potassium   3.3 L


 


Chloride   108 H


 


Carbon Dioxide   26


 


Anion Gap   6 L


 


BUN   4 L


 


Creatinine   0.6


 


Creat Clearance w eGFR   > 60


 


Random Glucose   87


 


Calcium   8.3 L


 


Magnesium   1.9


 


Total Bilirubin   4.2 H


 


AST   80 H


 


ALT   36


 


Alkaline Phosphatase   89


 


Total Protein   6.2 L


 


Albumin   2.5 L











HOSPITAL COURSE:





Patient is a 53 year old male with history of alcohol abuse, liver cirrhosis 

and gallbladder sludge.  Patient is s/p 27 days of detox at a rehab and 

presents to the ED on 1/6/2019 with abdominal pain and bright red blood from 

rectum that started the day before admission.   Upon evaluation in the ER he 

was found to have an elevated total bilirubin of 4.3, alt 47 and ast 134, 

normal platelets, normal hct/hmg, elevated ammonia level at 86. 





He will be discharged home today and agreement to go to [facility] to continue 

alcohol abstinence.  





Hospital course by problem list.





Procedures:


Colonoscopy 1/9/2019: colon mucosa normal, gi bleeding may be due to internal 

hemorrhoids.


EGD 1/9/2019: the mucosa of the esophagus appeared normal, large hiatal hernia, 

moderate gastropathy found in the gastric body. duodenal mucosa w/o 

abnormalities.  retroflexed views in the abdomen showed an ulcer.


----------


GI:


Acute lower GI bleed, resolved


hmg/hmt stable.


s/p EGD and colonoscopy with biopsies.  Patient will be discharged home with 

close follow up with GI.  For the large hiatal hernia, a referral to a general 

surgeon was given to him on his discharge packet.  


He was instructed to avoid nsaids, continue protonix 40mg daily x 8 weeks. 

repeat EGD in 2 months to assess for ulcer healing. 


He has tolerated advancement of his diet.  He reports soft brown bm this am, no 

blood. Denies abdominal pain, n/v.





Psyche:


ETOH abuse


Liver cirrhosis


patient states he is going to continue rehab at the [facility].  He is to 

continue with with thiamine, folate





Hypokalemia @ 3.3


Repleted with potassium supplement.  repeat labs outpatient.





Date of Admission:01/06/19





Date of Discharge: 01/09/19





Minutes to complete discharge: 60





Discharge Summary


Reason For Visit: RECTAL HEMORRHAGE, ABDOMINAL  PAIN


Current Active Problems





Abdominal pain (Acute)


Rectal bleeding (Acute)








Condition: Stable





- Instructions


Diet, Activity, Other Instructions: 


Mr. Gleason





You were placed under observation at [facility] for rectal 

bleeding.  Here are our recommendations:





Acute lowr GI bleed. resolved.  


your blood work is normal.  You had an EGD (esophagogastroduodenoscopy) which 

revealed ulcerations.  You were started on Protonix daily for 8 weeks.  Please 

continue this medication for 8 weeks and follow up with Dr. Sagastume for a 

repeat EGD in two months to assess that the ulcers are healing.  Please avoid 

NSAIDs (motrin, aspirin or ibuprophen)


A colonoscopy performed showed internal hemorrhoids.  Please continue the 

hemorrhoid suppositories.


On full liquid diet





ETOH abuse.  


Please continue your program at [facility].  Continue taking the Thiamine and 

Folate daily.





Please return to the ER with any new or worsening symptoms.  You asked me for a 

referral to a surgeon.  Enclosed you will find his name (Dr. Jefferson).  





I have referred you to two primary care doctors for follow up outpatient.  

Please call them with an appointment.





Continue all your medications from your discharge list.





Please call me with any questions





Aleyda Barton NP


[phone]


[facility]











Referrals: 


Hal Ayala MD [Staff Physician] - 1 Week (primary care doctor.  please 

call for an appointment. )


Jaz Kennedy MD [Staff Physician] - 1 Week (primary care doctor.  please 

call for an appointment. )


Junior Sagastume DO [Staff Physician] - 2 Weeks (follow up biopsy results 

of colonoscopy.  You will also need to see him in 2 months for a repeat EGD.)


Pedro Jefferson MD [Staff Physician] -  (general surgeon)


Disposition: HOME





- Home Medications


Comprehensive Discharge Medication List: 


Ambulatory Orders





Folic Acid - 1 mg PO DAILY #30 tablet 01/09/19 


Pantoprazole Sodium [Protonix] 40 mg PO DAILY #60 tablet.dr 01/09/19 


Phenylephrine 0.25%/Starch [Anusol Suppository -] 1 each RC BID #60 supp.rect 01 /09/19 


Thiamine HCl [Vitamin B1 -] 100 mg PO DAILY #30 tablet 01/09/19 








This patient is new to me today: No


Emergency Visit: Yes


ED Registration Date: 01/06/19


Care time: The patient presented to the Emergency Department on the above date 

and was hospitalized for further evaluation of their emergent condition.


Critical Care patient: No





- Discharge Referral


Referred to [facility] No

## 2019-01-09 NOTE — DS
Physical Exam: 


SUBJECTIVE: Patient seen and examined








OBJECTIVE:





 Vital Signs











 Period  Temp  Pulse  Resp  BP Sys/Agudelo  Pulse Ox


 


 Last 24 Hr  98.1 F-99.4 F  81-90  17-22  121-150/71-94  91-99








PHYSICAL EXAM





GENERAL: The patient is awake, alert, and fully oriented, in no acute distress.


HEAD: Normal with no signs of trauma.


EYES: PERRL, extraocular movements intact, sclera anicteric, conjunctiva clear. 


ENT: Ears normal, nares patent, oropharynx clear without exudates, moist mucous 

membranes.


NECK: Trachea midline, full range of motion, supple. 


LUNGS: Breath sounds equal, clear to auscultation bilaterally, no wheezes, no 

crackles, no accessory muscle use. 


HEART: Regular rate and rhythm, S1, S2 without murmur, rub or gallop.


ABDOMEN: Soft, nontender, nondistended, normoactive bowel sounds, no guarding, 

no rebound, no hepatosplenomegaly, no masses.


EXTREMITIES: 2+ pulses, warm, well-perfused, no edema. 


NEUROLOGICAL: Cranial nerves II through XII grossly intact. Normal speech, gait 

not observed.


PSYCH: Normal mood, normal affect.


SKIN: Warm, dry, normal turgor, no rashes or lesions noted.





LABS


 Laboratory Results - last 24 hr











  01/09/19 01/09/19





  06:00 06:00


 


WBC  9.0 


 


RBC  3.82 L 


 


Hgb  13.5 


 


Hct  41.7 


 


MCV  109.1 H 


 


MCH  35.4 H 


 


MCHC  32.4 


 


RDW  17.3 H 


 


Plt Count  238 


 


MPV  7.4 L 


 


Absolute Neuts (auto)  6.2 


 


Neutrophils %  69.5 


 


Lymphocytes %  18.0 


 


Monocytes %  10.2 


 


Eosinophils %  1.7 


 


Basophils %  0.6 


 


Nucleated RBC %  0 


 


Sodium   140


 


Potassium   3.3 L


 


Chloride   108 H


 


Carbon Dioxide   26


 


Anion Gap   6 L


 


BUN   4 L


 


Creatinine   0.6


 


Creat Clearance w eGFR   > 60


 


Random Glucose   87


 


Calcium   8.3 L


 


Magnesium   1.9


 


Total Bilirubin   4.2 H


 


AST   80 H


 


ALT   36


 


Alkaline Phosphatase   89


 


Total Protein   6.2 L


 


Albumin   2.5 L











HOSPITAL COURSE:





Date of Admission:01/06/19





Date of Discharge: 01/09/19














Discharge Summary


Reason For Visit: RECTAL HEMORRHAGE, ABDOMINAL  PAIN


Current Active Problems





Abdominal pain (Acute)


Rectal bleeding (Acute)








Condition: Stable





- Instructions





- Discharge Referral


Referred to [facility] No

## 2019-01-09 NOTE — PATH
Surgical Pathology Report



Patient Name:  KAM MCCAIN

Accession #:  

Med. Rec. #:  [id number]                                                        

   /Age/Gender:  1965 (Age: 53) / M

Account:  [id number]                                                          

             Location: [address] [place] [address]

Taken:  2019

Received:  2019

Reported:  2019

Physicians:  Kane Sagastume D.O.

PHYSICIAN EMERGENCY DEPT

  



Specimen(s) Received

A: BX ANGULARIS AND BODY 

B: BX FUNDUS GASTRIC 





Clinical History

Rectal hemorrhage, abdominal pain

Postoperative diagnosis: Gastric fundus ulcer, hiatal hernia, gastritis,

internal hemorrhoids







Final Diagnosis

A. ANGULARIS AND BODY, BIOPSY:

GASTRIC MUCOSA WITH MILD ACTIVE CHRONIC GASTRITIS.

IMMUNOSTAIN FOR H. PYLORI IS NEGATIVE.

NEGATIVE FOR INTESTINAL METAPLASIA.



B. GASTRIC FUNGUS ULCER, BIOPSY:

GASTRIC MUCOSA WITH MODERATELY ACTIVE CHRONIC GASTRITIS.

IMMUNOSTAIN FOR H. PYLORI IS NEGATIVE.

NEGATIVE FOR INTESTINAL METAPLASIA.









***Electronically Signed***

Darlene Thakkar M.D.





Gross Description

A.  Received in formalin, labeled "biopsy angularis and body" are 2 tan,

irregular portions of soft tissue measuring 0.2 and 0.3 cm. in greatest

dimension. The specimens are submitted in toto in one cassette.



B.  Received in formalin, labeled "biopsy gastric fundus ulcer" are 2 tan,

irregular portions of soft tissue averaging 0.2 cm. in greatest dimension. The

specimens are submitted in toto in one cassette.

/2019



[place]

## 2021-03-15 ENCOUNTER — HOSPITAL ENCOUNTER (EMERGENCY)
Dept: HOSPITAL 74 - JER | Age: 56
Discharge: HOME | End: 2021-03-15
Payer: COMMERCIAL

## 2021-03-15 VITALS — DIASTOLIC BLOOD PRESSURE: 91 MMHG | HEART RATE: 105 BPM | SYSTOLIC BLOOD PRESSURE: 154 MMHG

## 2021-03-15 VITALS — TEMPERATURE: 98.7 F

## 2021-03-15 VITALS — BODY MASS INDEX: 32.1 KG/M2

## 2021-03-15 DIAGNOSIS — B02.9: Primary | ICD-10-CM

## 2021-05-22 ENCOUNTER — HOSPITAL ENCOUNTER (EMERGENCY)
Dept: HOSPITAL 74 - JER | Age: 56
Discharge: LEFT BEFORE DISCHARGE | End: 2021-05-22
Payer: COMMERCIAL

## 2021-05-22 VITALS — DIASTOLIC BLOOD PRESSURE: 61 MMHG | HEART RATE: 90 BPM | TEMPERATURE: 98.1 F | SYSTOLIC BLOOD PRESSURE: 110 MMHG

## 2021-05-22 VITALS — BODY MASS INDEX: 31.7 KG/M2

## 2021-05-22 DIAGNOSIS — R19.5: ICD-10-CM

## 2021-05-22 DIAGNOSIS — R55: Primary | ICD-10-CM

## 2021-05-22 DIAGNOSIS — D64.9: ICD-10-CM

## 2021-05-22 LAB
ALBUMIN SERPL-MCNC: 3.7 G/DL (ref 3.4–5)
ALP SERPL-CCNC: 47 U/L (ref 45–117)
ALT SERPL-CCNC: 25 U/L (ref 13–61)
ANION GAP SERPL CALC-SCNC: 5 MMOL/L (ref 8–16)
ANISOCYTOSIS BLD QL: (no result)
APTT BLD: 23.1 SECONDS (ref 25.2–36.5)
AST SERPL-CCNC: 19 U/L (ref 15–37)
BASOPHILS # BLD: 0.5 % (ref 0–2)
BILIRUB SERPL-MCNC: 0.4 MG/DL (ref 0.2–1)
BUN SERPL-MCNC: 11.7 MG/DL (ref 7–18)
CALCIUM SERPL-MCNC: 8.8 MG/DL (ref 8.5–10.1)
CHLORIDE SERPL-SCNC: 107 MMOL/L (ref 98–107)
CO2 SERPL-SCNC: 26 MMOL/L (ref 21–32)
CREAT SERPL-MCNC: 0.8 MG/DL (ref 0.55–1.3)
DEPRECATED RDW RBC AUTO: 19 % (ref 11.9–15.9)
EOSINOPHIL # BLD: 0.2 % (ref 0–4.5)
GLUCOSE SERPL-MCNC: 126 MG/DL (ref 74–106)
HCT VFR BLD CALC: 27.7 % (ref 35.4–49)
HGB BLD-MCNC: 8.6 GM/DL (ref 11.7–16.9)
INR BLD: 1.03 (ref 0.83–1.09)
LYMPHOCYTES # BLD: 13 % (ref 8–40)
MACROCYTES BLD QL: 0
MAGNESIUM SERPL-MCNC: 1.9 MG/DL (ref 1.8–2.4)
MCH RBC QN AUTO: 19.2 PG (ref 25.7–33.7)
MCHC RBC AUTO-ENTMCNC: 30.9 G/DL (ref 32–35.9)
MCV RBC: 62.1 FL (ref 80–96)
MONOCYTES # BLD AUTO: 7.6 % (ref 3.8–10.2)
NEUTROPHILS # BLD: 78.7 % (ref 42.8–82.8)
PHOSPHATE SERPL-MCNC: 2 MG/DL (ref 2.5–4.9)
PLATELET # BLD AUTO: 228 K/MM3 (ref 134–434)
PLATELET BLD QL SMEAR: NORMAL
PMV BLD: 8.4 FL (ref 7.5–11.1)
PROT SERPL-MCNC: 6.6 G/DL (ref 6.4–8.2)
PT PNL PPP: 12.7 SEC (ref 9.7–13)
RBC # BLD AUTO: 4.47 M/MM3 (ref 4–5.6)
SODIUM SERPL-SCNC: 138 MMOL/L (ref 136–145)
WBC # BLD AUTO: 6.4 K/MM3 (ref 4–10)

## 2021-06-08 ENCOUNTER — HOSPITAL ENCOUNTER (OUTPATIENT)
Dept: HOSPITAL 74 - JASU-ENDO | Age: 56
Discharge: HOME | End: 2021-06-08
Attending: INTERNAL MEDICINE
Payer: COMMERCIAL

## 2021-06-08 VITALS — SYSTOLIC BLOOD PRESSURE: 133 MMHG | HEART RATE: 82 BPM | DIASTOLIC BLOOD PRESSURE: 80 MMHG

## 2021-06-08 VITALS — BODY MASS INDEX: 31.6 KG/M2

## 2021-06-08 VITALS — TEMPERATURE: 98.1 F

## 2021-06-08 DIAGNOSIS — K44.9: ICD-10-CM

## 2021-06-08 DIAGNOSIS — K57.30: ICD-10-CM

## 2021-06-08 DIAGNOSIS — K29.50: ICD-10-CM

## 2021-06-08 DIAGNOSIS — D64.9: Primary | ICD-10-CM

## 2021-06-08 PROCEDURE — 0DJD8ZZ INSPECTION OF LOWER INTESTINAL TRACT, VIA NATURAL OR ARTIFICIAL OPENING ENDOSCOPIC: ICD-10-PCS | Performed by: INTERNAL MEDICINE

## 2021-06-08 PROCEDURE — 0DB78ZX EXCISION OF STOMACH, PYLORUS, VIA NATURAL OR ARTIFICIAL OPENING ENDOSCOPIC, DIAGNOSTIC: ICD-10-PCS | Performed by: INTERNAL MEDICINE
